# Patient Record
Sex: FEMALE
[De-identification: names, ages, dates, MRNs, and addresses within clinical notes are randomized per-mention and may not be internally consistent; named-entity substitution may affect disease eponyms.]

---

## 2022-01-31 ENCOUNTER — HOSPITAL ENCOUNTER (OUTPATIENT)
Dept: HOSPITAL 61 - MAMMO | Age: 62
End: 2022-01-31
Attending: INTERNAL MEDICINE
Payer: COMMERCIAL

## 2022-01-31 DIAGNOSIS — Z12.31: Primary | ICD-10-CM

## 2022-01-31 PROCEDURE — 77067 SCR MAMMO BI INCL CAD: CPT

## 2022-01-31 PROCEDURE — 77063 BREAST TOMOSYNTHESIS BI: CPT

## 2022-02-02 NOTE — RAD
DATE: 1/31/2022



EXAM: MG BILAT SCREEN+RAMANDEEP



HISTORY: Screening. Sister with breast cancer at age of 60.



COMPARISON: 2/12/2018, 9/19/2016, 9/10/2012



This study was interpreted with the benefit of Computerized Aided Detection (CAD).





Breast Density: SCATTERED The breast parenchyma shows scattered fibroglandular densities. Breast pare
nchyma level B.





FINDINGS: There is a 6 mm ovoid focal asymmetry in the retroareolar left breast around 3-4 o'clock, 1
.5 cm posterior to the nipple. No suspicious calcifications or architectural distortion in either nisa
ast.



IMPRESSION: 6 mm focal asymmetry in the retroareolar left breast. Recommend spot compression CC and M
LO views and ultrasound to further evaluate.







BI-RADS CATEGORY: 0 INCOMPLETE: NEEDS ADDITIONAL IMAGING EVALUATION AND/OR PRIOR MAMMOGRAMS FOR KIMBERLY
RISON.



RECOMMENDED FOLLOW-UP: ADD ADDITIONAL IMAGING



PQRS compliance statement: Patient information was entered into a reminder system with a target due d
ate for the next mammogram.



Mammography is a sensitive method for finding small breast cancers, but it does not detect them all a
nd is not a substitute for careful clinical examination.  A negative mammogram does not negate a clin
ically suspicious finding and should not result in delay in biopsying a clinically suspicious abnorma
lity.



"Our facility is accredited by the [employer] Mammography Program."









Electronically signed by: Claire Beyer MD (2/2/2022 9:32 AM) [id number]

## 2022-02-07 ENCOUNTER — HOSPITAL ENCOUNTER (OUTPATIENT)
Dept: HOSPITAL 61 - MAMMO | Age: 62
End: 2022-02-07
Attending: INTERNAL MEDICINE
Payer: COMMERCIAL

## 2022-02-07 DIAGNOSIS — R92.8: Primary | ICD-10-CM

## 2022-02-07 PROCEDURE — 77065 DX MAMMO INCL CAD UNI: CPT

## 2022-02-07 PROCEDURE — 76641 ULTRASOUND BREAST COMPLETE: CPT

## 2022-02-07 NOTE — RAD
EXAM: Left breast diagnostic mammogram; left breast sonogram.



HISTORY: 61-year-old female presents for evaluation of asymmetry within the left breast demonstrate o
n a mammogram dated 1/31/2022.



TECHNIQUE: Spot compression views of the left breast are obtained. Sonographic imaging of the left br
east targeted to the site of mammographic asymmetry was also performed.



COMPARISON: 1/31/2022 and 2/12/2018



BREAST PARENCHYMAL DENSITY: Level B - Scattered fibroglandular densities.



FINDINGS: There is no persistent finding of concern within the left breast with additional mammograph
ic views.



Sonographic imaging of the left breast demonstrates no suspicious finding.



IMPRESSION: 

1. No persistent suspicious mammographic or sonographic finding within the left breast.

2. BI-RADS Category 2: Benign finding(s). 



RECOMMENDATION: Annual mammography is recommended.



If your mammogram demonstrates that you have dense breast tissue, which could hide abnormalities, and
 if you have other risk factors for breast cancer that have been identified, you might benefit from s
upplemental screening tests that may be suggested by your ordering physician.  Dense breast tissue, i
n and of itself, is a relatively common condition.  This information is not provided to cause undue c
oncern, but rather to raise your awareness and to promote discussion with your physician regarding th
e presence of other risk factors, in addition to dense breast tissue. A report of your mammography re
sults will be sent to you and your physician.  You should contact your physician if you have any ques
tions or concerns regarding this report.  



Mammography is a sensitive method for finding small breast cancers, but it does not detect them all a
nd is not a substitute for careful clinical examination.  A negative mammogram does not negate a clin
ically suspicious finding and should not result in delay in biopsying a clinically suspicious abnorma
lity.



PQRS compliance statement -  Patient information was entered into a reminder system with a target due
 date for the next mammogram. 



"Our facility is accredited by the [employer] Mammography Program."



Electronically signed by: Sanjuanita May MD (2/7/2022 9:07 AM) [id number]

## 2022-02-07 NOTE — RAD
EXAM: Left breast diagnostic mammogram; left breast sonogram.



HISTORY: 61-year-old female presents for evaluation of asymmetry within the left breast demonstrate o
n a mammogram dated 1/31/2022.



TECHNIQUE: Spot compression views of the left breast are obtained. Sonographic imaging of the left br
east targeted to the site of mammographic asymmetry was also performed.



COMPARISON: 1/31/2022 and 2/12/2018



BREAST PARENCHYMAL DENSITY: Level B - Scattered fibroglandular densities.



FINDINGS: There is no persistent finding of concern within the left breast with additional mammograph
ic views.



Sonographic imaging of the left breast demonstrates no suspicious finding.



IMPRESSION: 

1. No persistent suspicious mammographic or sonographic finding within the left breast.

2. BI-RADS Category 2: Benign finding(s). 



RECOMMENDATION: Annual mammography is recommended.



If your mammogram demonstrates that you have dense breast tissue, which could hide abnormalities, and
 if you have other risk factors for breast cancer that have been identified, you might benefit from s
upplemental screening tests that may be suggested by your ordering physician.  Dense breast tissue, i
n and of itself, is a relatively common condition.  This information is not provided to cause undue c
oncern, but rather to raise your awareness and to promote discussion with your physician regarding th
e presence of other risk factors, in addition to dense breast tissue. A report of your mammography re
sults will be sent to you and your physician.  You should contact your physician if you have any ques
tions or concerns regarding this report.  



Mammography is a sensitive method for finding small breast cancers, but it does not detect them all a
nd is not a substitute for careful clinical examination.  A negative mammogram does not negate a clin
ically suspicious finding and should not result in delay in biopsying a clinically suspicious abnorma
lity.



PQRS compliance statement -  Patient information was entered into a reminder system with a target due
 date for the next mammogram. 



"Our facility is accredited by the [employer] Mammography Program."



Electronically signed by: Sanjuanita May MD (2/7/2022 9:06 AM) [id number]